# Patient Record
(demographics unavailable — no encounter records)

---

## 2025-04-14 NOTE — ASSESSMENT
[FreeTextEntry1] : 50M former smoker with obesity, OA, occasional palpitations presents for follow up of GERD and LA B esophagitis with IM at time of 8/2024 endoscopy.  #History of polyps - recent colonoscopy with one 2 mm TA, BBPS 9 prep, due 7-10 years  #Heartburn #LA B esophagitis #Pancreatic rest -  age 50, central obesity, intermittent GERD symptoms all increase risk for BE and he was a smoker; EGD showed LA B esophagitis without clear BE, biopsies with columnar mucosa with IM without dysplasia - discussed that esophagitis can cause IM in the absence of BE and that recommendation would be repeat upper endoscopy on BID PPI for 6-8 weeks to treat active inflammation and unmask any true BE; he is amenable to this but will be unable to schedule prior to leaving for [place] - will establish with a provider in [place] and have repeat EGD, discussed he should have an EGD within the next year, rx for BID PPI provided  #Likely pancreatic rest - reviewed that gastroenterologist he establishes with in [place] will re-assess during repeat endoscopy

## 2025-04-14 NOTE — HISTORY OF PRESENT ILLNESS
[de-identified] : 08/20/2024 Findings:      The upper third of the esophagus and middle third of the esophagus were normal.      LA Grade B (one or more mucosal breaks greater than 5 mm, not extending between the tops of       two mucosal folds) esophagitis with no bleeding was found in the lower third of the esophagus.      The Z-line was irregular and was found 43 cm from the incisors. Biopsies were taken with a       cold forceps for histology.      The gastroesophageal flap valve was visualized endoscopically and classified as Hill Grade I       (prominent fold, tight to endoscope).      Localized severe inflammation characterized by erosions, erythema, friability and granularity       was found in the gastric antrum. Biopsies were taken with a cold forceps for histology.      Patchy mildly erythematous mucosa without bleeding was found in the gastric body. Biopsies       were taken with a cold forceps for histology.      A single 10 mm submucosal papule (nodule) was found in the gastric antrum. There was a       central umbilication consistent with a pancreatic rest. Biopsies were taken with a cold       forceps for histology.      The exam of the stomach was otherwise normal.      Patchy severe inflammation characterized by congestion (edema), erythema and friability was       found in the duodenal bulb.      A single 3 mm sessile polyp was found in the second portion of the duodenum. The polyp was       removed with a cold biopsy forceps. Resection and retrieval were complete.      The exam of the duodenum was otherwise normal.                                                                                                      Impression:          - Normal upper third of esophagus and middle third of esophagus.                      - LA Grade B esophagitis.                      - Z-line irregular, 43 cm from the incisors. Biopsied.                      - Gastroesophageal flap valve classified as Hill Grade I (prominent fold,                       tight to endoscope).                      - Gastritis. Biopsied.                      - Erythematous mucosa in the gastric body. Biopsied.                      - A single submucosal papule (nodule) found in the stomach. Biopsied.                      - Duodenitis.                      - A single duodenal polyp. Resected and retrieved.  [FreeTextEntry1] : 08/20/2024 Findings:      The perianal and digital rectal examinations were normal.      The terminal ileum appeared normal.      Two sessile polyps were found in the sigmoid colon. The polyps were 1 to 2 mm in size. These       polyps were removed with a jumbo cold forceps. Resection and retrieval were complete.      Non-bleeding internal hemorrhoids were found during retroflexion. The hemorrhoids were small       and Grade I (internal hemorrhoids that do not prolapse).      The exam was otherwise without abnormality on direct and retroflexion views.  Final Diagnosis  1. Duodenum, polyp, biopsy: - Small intestinal mucosa with preserved villous architecture, no increase in intraepithelial lymphocytes and focal mild reactive epithelial changes - See note  Note: Part 1.  Multiple levels were examined.  2.  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 - Negative for intestinal metaplasia. - See note  Note: Part 2.  Iron special stain highlights iron deposition in macrophages and stromal cells. This pattern may be observed in the setting of gastric inflammation, ulceration or prior hemorrhage.  3. Stomach, nodule, biopsy: - Gastric antral mucosa with reactive changes - No morphologic evidence of Helicobacter microorganisms - Negative for intestinal metaplasia - See note  Note: Part 3.  Multiple levels were examined. No submucosal tissue is seen.  4. Stomach, body, biopsy: - Gastric oxyntic mucosa with no significant diagnostic alterations - No morphologic evidence of Helicobacter microorganisms - Negative for intestinal metaplasia  5. Z line, biopsy: - Columnar/cardia-type mucosa with chronic inflammation and intestinal metaplasia - Negative for dysplasia - Squamous mucosa with mild reactive changes - See note  Note: Part 5.  The above findings may represent Benson's esophagus or intestinal metaplasia of the gastric cardia; endoscopic correlation is essential.  6. Colon, sigmoid, polyps, polypectomy: - Tubular adenoma - Hyperplastic polyp with lymphoid aggregate